# Patient Record
Sex: MALE | URBAN - METROPOLITAN AREA
[De-identification: names, ages, dates, MRNs, and addresses within clinical notes are randomized per-mention and may not be internally consistent; named-entity substitution may affect disease eponyms.]

---

## 2019-06-03 ENCOUNTER — OFFICE VISIT (OUTPATIENT)
Dept: URBAN - METROPOLITAN AREA CLINIC 32 | Facility: CLINIC | Age: 54
End: 2019-06-03
Payer: COMMERCIAL

## 2019-06-03 DIAGNOSIS — H34.12 CENTRAL RETINAL ARTERY OCCLUSION, LEFT EYE: Primary | ICD-10-CM

## 2019-06-03 PROCEDURE — 92004 COMPRE OPH EXAM NEW PT 1/>: CPT | Performed by: OPHTHALMOLOGY

## 2019-06-05 ENCOUNTER — OFFICE VISIT (OUTPATIENT)
Dept: URBAN - METROPOLITAN AREA CLINIC 45 | Facility: CLINIC | Age: 54
End: 2019-06-05
Payer: COMMERCIAL

## 2019-06-05 DIAGNOSIS — H47.093 OTHER DISORDER OF BILATERAL OPTIC NERVE: Primary | ICD-10-CM

## 2019-06-05 PROCEDURE — 92134 CPTRZ OPH DX IMG PST SGM RTA: CPT | Performed by: OPHTHALMOLOGY

## 2019-06-05 PROCEDURE — 92004 COMPRE OPH EXAM NEW PT 1/>: CPT | Performed by: OPHTHALMOLOGY

## 2019-06-05 ASSESSMENT — INTRAOCULAR PRESSURE
OD: 12
OS: 14

## 2019-06-05 NOTE — IMPRESSION/PLAN
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 Plan: Referred for retina eval. Hx of recent eye exam on 6/1/19 w/optometrist (Dr. Marcella Dawkins) with documented Va 20/20 OU. Was eval w/Dr. Dueñas Payment 2 days ago with VA 20/200 OD, no light perception OS. Exam today Va today no light perception OU. Rapid vision loss in 48 yo M concerning for underlying systemic condition- needs stat ED evaluation with extensive workup to rule out leukemic vs hypercoagulable vs CNS vs infectious process. ROS pos for dizziness days prior to onset of vision problems. Exam today w/severe optic disc edema with tortuous retinal vessels OU. Based on exam findings, rec pt to go directly to ER for systemic w/u. Today's exam findings printed and provided to pt to take to ER visit. Please feel free to contact myself for further information: [phone], ext [phone] to speak with clinic staff.